# Patient Record
(demographics unavailable — no encounter records)

---
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
## 2024-11-18 NOTE — PHYSICAL EXAM
[FreeTextEntry1] : alert and oriented x 3, speech fluent, names easily, follows requests, good recall for recent and remote events. EOM full without sustained nystagmus, PERRL, intact LT V1-V3 bilaterally, face symmetrical, no dysarthria, tongue midline, normal shoulder elevation. Motor - normal motion in upper extremities bilaterally. normal rapid-alternating movements, no drift Coord - no tremor, ataxia   has bruises on dorsal aspect of forearm.
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
## 2025-02-03 NOTE — PHYSICAL EXAM
[FreeTextEntry1] : alert and oriented x 3, speech fluent, names easily, follows requests, good recall for recent and remote events. EOM full without sustained nystagmus, PERRL, intact LT V1-V3 bilaterally, face symmetrical, no dysarthria, tongue midline, normal shoulder elevation. Motor - normal motion in upper extremities bilaterally. normal rapid-alternating movements, no drift Coord - no tremor, ataxia

## 2025-07-10 NOTE — CONSULT LETTER
[Dear  ___] : Dear  [unfilled], [Consult Letter:] : I had the pleasure of evaluating your patient, [unfilled]. [FreeTextEntry1] : The patient was seen in hand consultation today. A copy of my office note is enclosed for your review with the patient's knowledge and consent.  Sincerely,  Dwayne Jung MD Chief, Hand Surgery Residency  ([phone]) Department of Orthopaedic Surgery  [facility] Professor of Orthopaedic Surgery Richard VILLARREAL at [facility]

## 2025-07-10 NOTE — PHYSICAL EXAM
[de-identified] : Left hand Fourth ray Dupuytren's cord slightly ulnar deviating ring finger MP extension -15 degrees DIP hyperextended Extensor mechanism tight. Passive DIP flexion 30 degrees DIP 3, DIP 5 flexion 60 degrees EDC IV dislocates ulnarly with flexion When patient makes maximum fist  EDC 4 dislocates ulnarly.   With extension of MP joint, on occasion he DISI 4 snapped into position causing a trigger like phenomenon to occur. EDC III also dislocates ulnarly but does not relocate with a snap phenomenon. MP 3, 4 RCL grade 3 laxity contributing to the ulnar deviation. Ring finger A1 pulley slightly tender.   Flexor tendon triggering not demonstrated although a small nodule was palpable in the tendon with flexion/extension and simultaneous A1 pulley pressure. Remaining digits: No obvious triggering. Remaining digits full active flexion. No notable additional MP or IP findings Thumb basal joint slight crepitus minimal discomfort. No additional pertinent positive contributory findings.  Right wrist Good motion without localizing findings. Right hand No A1 pulley tenderness and no triggering in any finger. EDC tendons are located. EDC 3 and 4 tendons translated ulnarly but do not dislocate. No pertinent MP, PIP, or DIP joint contributory findings, except some Heberden's nodes; none are clinically painful.  Neurologic: Median, ulnar, and radial motor and sensory are intact.  Skin: No cyanosis, clubbing, or rashes. Vascular: Radial pulses intact. Lymphatic: No streaking or epitrochlear adenopathy. The patient is awake, alert, and oriented.  Patient expresses considerable anxiety and fearfulness about treatment including injection.   Affect is accordingly tense. Patient expressed concern about possible vasovagal reaction.  During the course of treatment patient felt lightheaded and was positioned supine on the exam table for the remainder of treatment was performed Otherwise, cooperative.

## 2025-07-10 NOTE — PHYSICAL EXAM
[de-identified] : Left hand Fourth ray Dupuytren's cord slightly ulnar deviating ring finger MP extension -15 degrees DIP hyperextended Extensor mechanism tight. Passive DIP flexion 30 degrees DIP 3, DIP 5 flexion 60 degrees EDC IV dislocates ulnarly with flexion When patient makes maximum fist  EDC 4 dislocates ulnarly.   With extension of MP joint, on occasion he DISI 4 snapped into position causing a trigger like phenomenon to occur. EDC III also dislocates ulnarly but does not relocate with a snap phenomenon. MP 3, 4 RCL grade 3 laxity contributing to the ulnar deviation. Ring finger A1 pulley slightly tender.   Flexor tendon triggering not demonstrated although a small nodule was palpable in the tendon with flexion/extension and simultaneous A1 pulley pressure. Remaining digits: No obvious triggering. Remaining digits full active flexion. No notable additional MP or IP findings Thumb basal joint slight crepitus minimal discomfort. No additional pertinent positive contributory findings.  Right wrist Good motion without localizing findings. Right hand No A1 pulley tenderness and no triggering in any finger. EDC tendons are located. EDC 3 and 4 tendons translated ulnarly but do not dislocate. No pertinent MP, PIP, or DIP joint contributory findings, except some Heberden's nodes; none are clinically painful.  Neurologic: Median, ulnar, and radial motor and sensory are intact.  Skin: No cyanosis, clubbing, or rashes. Vascular: Radial pulses intact. Lymphatic: No streaking or epitrochlear adenopathy. The patient is awake, alert, and oriented.  Patient expresses considerable anxiety and fearfulness about treatment including injection.   Affect is accordingly tense. Patient expressed concern about possible vasovagal reaction.  During the course of treatment patient felt lightheaded and was positioned supine on the exam table for the remainder of treatment was performed Otherwise, cooperative.

## 2025-07-10 NOTE — ASSESSMENT
[FreeTextEntry1] : Problems and treatment: Dislocating EDC MP 4, MP 3 Ulnar deviation long finger, ring finger; RLC grade 3 laxity 4th ray Dupuytren's cord; 20 degree MP flexion contracture DIP 4 hyperextension secondary to tissue tightness 3rd ray cord; 10 degree MP contracture MP extension -10 degrees Cortisone injection into ring finger flexor sheath with lidocaine preinjection.   Patient is 82-year-old female with flexion posturing of left hand primarily ring finger and to a lesser degree middle and little fingers.  Patient has several contributory elements: Dislocating EDC tendon, grade 3 laxity RCL, Dupuytren's cord causing mild contracture.  Patient has avoided flexion of the ring finger because of pain and the finger has become stiffer.  Furthermore patient has developed a relative DIP hyperextension deformity secondary to tissue tightness. Patient expresses considerable anxiety regarding her condition and treatment options especially injections because of fear of associated pain and discomfort.  Patient's  was present throughout and helped in counseling and supporting the patient and her decision making. Question of triggering of the ring finger was considered.  Clinically there was subtle triggering but the primary problem was the flexion deformity and ulnar deviation which was chronic and caused the DIP extension tightness. Dislocating EDC resulted in a trigger like behavior of the finger as the MP joint moves from flexion to extension.  EDC tendon be located with a snapping phenomenon that resembled triggering but was not actual flexor tendon triggering. I reviewed the nature of the condition, pathophysiology, treatment alternatives risks and complications with patient and .  Questions answered. Following this discussion patient accepted and was treated with 2 phase cortisone injection including manipulation of the digit.  When anesthesia achieved and after injection patient was able to fully flex the finger with the exception of DIP extension tightness.  Manipulation achieved full flexion of the MP and PIP joint and DIP flexion of 45 to 50 degrees but not more.  There is no flexor tendon triggering noted but rather the snapping of the EDC tendon back into position on the dorsum of MP joint as the finger extended from flexion to extension. Prognosis is uncertain/guarded for notable improvement. In my opinion the patient would need to have reconstruction of extensor mechanism to relocate EDC of middle and ring fingers over MP joints as well as release Dupuytren's cords to allow for MP extension. After discussion, considering patient's age, activity level, and anxiety regarding treatment, I believe that performing these procedures surgically combined with the uncertainty of outcome argues against performing surgery.  Rather I have encouraged the patient to adapt to modify activities.  If the patient functions satisfactorily and pain is manageable then I urged the patient to accept her current hand status. If the patient is unable to do so, for what ever reason, the patient should return for additional evaluation, conferencing and formulation of a treatment plan. In excess of 65 minutes required for analysis, treatment, recommendation, education, coordination of care, review of records counseling for treatment of the patient.

## 2025-07-10 NOTE — CONSULT LETTER
[Dear  ___] : Dear  [unfilled], [Consult Letter:] : I had the pleasure of evaluating your patient, [unfilled]. [FreeTextEntry1] : The patient was seen in hand consultation today. A copy of my office note is enclosed for your review with the patient's knowledge and consent.  Sincerely,  Dwayne Jung MD Chief, Hand Surgery Residency  ([phone]) Department of Orthopaedic Surgery  [facility] Professor of Orthopaedic Surgery Richard VILLARREAL at [facility]

## 2025-07-10 NOTE — CONSULT LETTER
[Dear  ___] : Dear  [unfilled], [Consult Letter:] : I had the pleasure of evaluating your patient, [unfilled]. [FreeTextEntry1] : The patient was seen in hand consultation today. A copy of my office note is enclosed for your review with the patient's knowledge and consent.  Sincerely,  Dwayne Jung MD Chief, Hand Surgery Residency  ([phone]) Department of Orthopaedic Surgery  [facility] Professor of Orthopaedic Surgery Richard VILLARREAL at [facility]

## 2025-07-10 NOTE — PROCEDURE
[] : The injection was done in 2 phases with the first phase being subcutaneous injection of lidocaine 1.5 cc subcutaneously as anesthetic. When adequate level of anesthesia was achieved, the Celestone injection was undertaken. [4th] : 4th finger [FreeTextEntry1] : Because patient felt lightheaded during the process of treatment and after lidocaine injection the patient was transferred to the exam table where she was positioned supine for the remainder of treatment. Patient suffered no adverse effect.

## 2025-07-10 NOTE — PHYSICAL EXAM
[de-identified] : Left hand Fourth ray Dupuytren's cord slightly ulnar deviating ring finger MP extension -15 degrees DIP hyperextended Extensor mechanism tight. Passive DIP flexion 30 degrees DIP 3, DIP 5 flexion 60 degrees EDC IV dislocates ulnarly with flexion When patient makes maximum fist  EDC 4 dislocates ulnarly.   With extension of MP joint, on occasion he DISI 4 snapped into position causing a trigger like phenomenon to occur. EDC III also dislocates ulnarly but does not relocate with a snap phenomenon. MP 3, 4 RCL grade 3 laxity contributing to the ulnar deviation. Ring finger A1 pulley slightly tender.   Flexor tendon triggering not demonstrated although a small nodule was palpable in the tendon with flexion/extension and simultaneous A1 pulley pressure. Remaining digits: No obvious triggering. Remaining digits full active flexion. No notable additional MP or IP findings Thumb basal joint slight crepitus minimal discomfort. No additional pertinent positive contributory findings.  Right wrist Good motion without localizing findings. Right hand No A1 pulley tenderness and no triggering in any finger. EDC tendons are located. EDC 3 and 4 tendons translated ulnarly but do not dislocate. No pertinent MP, PIP, or DIP joint contributory findings, except some Heberden's nodes; none are clinically painful.  Neurologic: Median, ulnar, and radial motor and sensory are intact.  Skin: No cyanosis, clubbing, or rashes. Vascular: Radial pulses intact. Lymphatic: No streaking or epitrochlear adenopathy. The patient is awake, alert, and oriented.  Patient expresses considerable anxiety and fearfulness about treatment including injection.   Affect is accordingly tense. Patient expressed concern about possible vasovagal reaction.  During the course of treatment patient felt lightheaded and was positioned supine on the exam table for the remainder of treatment was performed Otherwise, cooperative.  Spouse Analia Corrales [phone]/Health Care Proxy (HCP)

## 2025-07-10 NOTE — PHYSICAL EXAM
[de-identified] : Left hand Fourth ray Dupuytren's cord slightly ulnar deviating ring finger MP extension -15 degrees DIP hyperextended Extensor mechanism tight. Passive DIP flexion 30 degrees DIP 3, DIP 5 flexion 60 degrees EDC IV dislocates ulnarly with flexion When patient makes maximum fist  EDC 4 dislocates ulnarly.   With extension of MP joint, on occasion he DISI 4 snapped into position causing a trigger like phenomenon to occur. EDC III also dislocates ulnarly but does not relocate with a snap phenomenon. MP 3, 4 RCL grade 3 laxity contributing to the ulnar deviation. Ring finger A1 pulley slightly tender.   Flexor tendon triggering not demonstrated although a small nodule was palpable in the tendon with flexion/extension and simultaneous A1 pulley pressure. Remaining digits: No obvious triggering. Remaining digits full active flexion. No notable additional MP or IP findings Thumb basal joint slight crepitus minimal discomfort. No additional pertinent positive contributory findings.  Right wrist Good motion without localizing findings. Right hand No A1 pulley tenderness and no triggering in any finger. EDC tendons are located. EDC 3 and 4 tendons translated ulnarly but do not dislocate. No pertinent MP, PIP, or DIP joint contributory findings, except some Heberden's nodes; none are clinically painful.  Neurologic: Median, ulnar, and radial motor and sensory are intact.  Skin: No cyanosis, clubbing, or rashes. Vascular: Radial pulses intact. Lymphatic: No streaking or epitrochlear adenopathy. The patient is awake, alert, and oriented.  Patient expresses considerable anxiety and fearfulness about treatment including injection.   Affect is accordingly tense. Patient expressed concern about possible vasovagal reaction.  During the course of treatment patient felt lightheaded and was positioned supine on the exam table for the remainder of treatment was performed Otherwise, cooperative.

## 2025-07-10 NOTE — HISTORY OF PRESENT ILLNESS
[FreeTextEntry1] : The patient is 83 yo RHD female PMH seizure disorder, anxiety.  Patient works as a W psychotherapist. Patient's  who is trained as an ophthalmologist serves as her PCP physician.  TODAY: Patient presents as a NEW patient for hand evaluation. The patient started to do PT. Had issues w balance and falling. Pt went for therapy to help balance. Pt was doing pulley activities. Pt c/o pain in left hand. Patient and  provide history. It is unclear how long the patient has had flexion deformity of the left hand especially ring finger. Little finger extends passively There is also fourth ray Dupuytren's cord causing slight ulnar deviation and restricting extension  EDC 4 subluxes ulnarly with flexion.   Occasionally, when fully flexed EDC snaps into position causing a triggering-like phenomenon.  As MP 4 extends from flexed position EDC 3 also subluxes ulnarly.  EDC relocates when MP is extended but there is no obvious snap as an MP 3 extends

## 2025-07-10 NOTE — HISTORY OF PRESENT ILLNESS
[FreeTextEntry1] : The patient is 81 yo RHD female PMH seizure disorder, anxiety.  Patient works as a W psychotherapist. Patient's  who is trained as an ophthalmologist serves as her PCP physician.  TODAY: Patient presents as a NEW patient for hand evaluation. The patient started to do PT. Had issues w balance and falling. Pt went for therapy to help balance. Pt was doing pulley activities. Pt c/o pain in left hand. Patient and  provide history. It is unclear how long the patient has had flexion deformity of the left hand especially ring finger. Little finger extends passively There is also fourth ray Dupuytren's cord causing slight ulnar deviation and restricting extension  EDC 4 subluxes ulnarly with flexion.   Occasionally, when fully flexed EDC snaps into position causing a triggering-like phenomenon.  As MP 4 extends from flexed position EDC 3 also subluxes ulnarly.  EDC relocates when MP is extended but there is no obvious snap as an MP 3 extends

## 2025-07-10 NOTE — CONSULT LETTER
[Dear  ___] : Dear  [unfilled], [Consult Letter:] : I had the pleasure of evaluating your patient, [unfilled]. [FreeTextEntry1] : The patient was seen in hand consultation today. A copy of my office note is enclosed for your review with the patient's knowledge and consent.  Sincerely,  Dwayne Jung MD Chief, Hand Surgery Residency  ([phone]) Department of Orthopaedic Surgery  [facility] Professor of Orthopaedic Surgery Richard VILLARREAL at [facility]